# Patient Record
Sex: FEMALE | Race: OTHER | URBAN - METROPOLITAN AREA
[De-identification: names, ages, dates, MRNs, and addresses within clinical notes are randomized per-mention and may not be internally consistent; named-entity substitution may affect disease eponyms.]

---

## 2018-06-02 ENCOUNTER — OUTPATIENT (OUTPATIENT)
Dept: OUTPATIENT SERVICES | Facility: HOSPITAL | Age: 73
LOS: 1 days | Discharge: HOME | End: 2018-06-02

## 2018-06-02 DIAGNOSIS — D64.9 ANEMIA, UNSPECIFIED: ICD-10-CM

## 2018-06-02 DIAGNOSIS — R79.9 ABNORMAL FINDING OF BLOOD CHEMISTRY, UNSPECIFIED: ICD-10-CM

## 2018-06-11 ENCOUNTER — OUTPATIENT (OUTPATIENT)
Dept: OUTPATIENT SERVICES | Facility: HOSPITAL | Age: 73
LOS: 1 days | Discharge: HOME | End: 2018-06-11

## 2018-06-11 DIAGNOSIS — Z79.899 OTHER LONG TERM (CURRENT) DRUG THERAPY: ICD-10-CM

## 2025-06-12 ENCOUNTER — INPATIENT (INPATIENT)
Facility: HOSPITAL | Age: 80
LOS: 0 days | Discharge: HOME HEALTH | DRG: 101 | End: 2025-06-13
Attending: PSYCHIATRY & NEUROLOGY | Admitting: PSYCHIATRY & NEUROLOGY
Payer: MEDICARE

## 2025-06-12 VITALS
TEMPERATURE: 98 F | HEART RATE: 72 BPM | WEIGHT: 104.06 LBS | DIASTOLIC BLOOD PRESSURE: 67 MMHG | OXYGEN SATURATION: 95 % | RESPIRATION RATE: 16 BRPM | SYSTOLIC BLOOD PRESSURE: 154 MMHG

## 2025-06-12 DIAGNOSIS — R56.9 UNSPECIFIED CONVULSIONS: ICD-10-CM

## 2025-06-12 DIAGNOSIS — Z96.1 PRESENCE OF INTRAOCULAR LENS: Chronic | ICD-10-CM

## 2025-06-12 DIAGNOSIS — Z95.0 PRESENCE OF CARDIAC PACEMAKER: Chronic | ICD-10-CM

## 2025-06-12 LAB
ALBUMIN SERPL ELPH-MCNC: 4.1 G/DL — SIGNIFICANT CHANGE UP (ref 3.5–5.2)
ALP SERPL-CCNC: 87 U/L — SIGNIFICANT CHANGE UP (ref 30–115)
ALT FLD-CCNC: 39 U/L — SIGNIFICANT CHANGE UP (ref 0–41)
ANION GAP SERPL CALC-SCNC: 10 MMOL/L — SIGNIFICANT CHANGE UP (ref 7–14)
ANION GAP SERPL CALC-SCNC: 13 MMOL/L — SIGNIFICANT CHANGE UP (ref 7–14)
APPEARANCE UR: CLEAR — SIGNIFICANT CHANGE UP
APTT BLD: 25.8 SEC — LOW (ref 27–39.2)
AST SERPL-CCNC: 45 U/L — HIGH (ref 0–41)
BASOPHILS # BLD AUTO: 0.1 K/UL — SIGNIFICANT CHANGE UP (ref 0–0.2)
BASOPHILS NFR BLD AUTO: 1.5 % — HIGH (ref 0–1)
BILIRUB SERPL-MCNC: 0.4 MG/DL — SIGNIFICANT CHANGE UP (ref 0.2–1.2)
BILIRUB UR-MCNC: NEGATIVE — SIGNIFICANT CHANGE UP
BUN SERPL-MCNC: 25 MG/DL — HIGH (ref 10–20)
BUN SERPL-MCNC: 28 MG/DL — HIGH (ref 10–20)
CALCIUM SERPL-MCNC: 9.6 MG/DL — SIGNIFICANT CHANGE UP (ref 8.4–10.5)
CALCIUM SERPL-MCNC: 9.9 MG/DL — SIGNIFICANT CHANGE UP (ref 8.4–10.5)
CHLORIDE SERPL-SCNC: 106 MMOL/L — SIGNIFICANT CHANGE UP (ref 98–110)
CHLORIDE SERPL-SCNC: 106 MMOL/L — SIGNIFICANT CHANGE UP (ref 98–110)
CO2 SERPL-SCNC: 23 MMOL/L — SIGNIFICANT CHANGE UP (ref 17–32)
CO2 SERPL-SCNC: 24 MMOL/L — SIGNIFICANT CHANGE UP (ref 17–32)
COLOR SPEC: YELLOW — SIGNIFICANT CHANGE UP
CREAT SERPL-MCNC: 1.2 MG/DL — SIGNIFICANT CHANGE UP (ref 0.7–1.5)
CREAT SERPL-MCNC: 1.5 MG/DL — SIGNIFICANT CHANGE UP (ref 0.7–1.5)
DIFF PNL FLD: NEGATIVE — SIGNIFICANT CHANGE UP
EGFR: 35 ML/MIN/1.73M2 — LOW
EGFR: 35 ML/MIN/1.73M2 — LOW
EGFR: 46 ML/MIN/1.73M2 — LOW
EGFR: 46 ML/MIN/1.73M2 — LOW
EOSINOPHIL # BLD AUTO: 0.08 K/UL — SIGNIFICANT CHANGE UP (ref 0–0.7)
EOSINOPHIL NFR BLD AUTO: 1.2 % — SIGNIFICANT CHANGE UP (ref 0–8)
GLUCOSE BLDC GLUCOMTR-MCNC: 83 MG/DL — SIGNIFICANT CHANGE UP (ref 70–99)
GLUCOSE SERPL-MCNC: 247 MG/DL — HIGH (ref 70–99)
GLUCOSE SERPL-MCNC: 72 MG/DL — SIGNIFICANT CHANGE UP (ref 70–99)
GLUCOSE UR QL: 100 MG/DL
HCT VFR BLD CALC: 35.7 % — LOW (ref 37–47)
HGB BLD-MCNC: 11.1 G/DL — LOW (ref 12–16)
IMM GRANULOCYTES NFR BLD AUTO: 0.5 % — HIGH (ref 0.1–0.3)
INR BLD: 0.91 RATIO — SIGNIFICANT CHANGE UP (ref 0.65–1.3)
KETONES UR QL: NEGATIVE MG/DL — SIGNIFICANT CHANGE UP
LEUKOCYTE ESTERASE UR-ACNC: NEGATIVE — SIGNIFICANT CHANGE UP
LYMPHOCYTES # BLD AUTO: 1.56 K/UL — SIGNIFICANT CHANGE UP (ref 1.2–3.4)
LYMPHOCYTES # BLD AUTO: 23.5 % — SIGNIFICANT CHANGE UP (ref 20.5–51.1)
MCHC RBC-ENTMCNC: 27.6 PG — SIGNIFICANT CHANGE UP (ref 27–31)
MCHC RBC-ENTMCNC: 31.1 G/DL — LOW (ref 32–37)
MCV RBC AUTO: 88.8 FL — SIGNIFICANT CHANGE UP (ref 81–99)
MONOCYTES # BLD AUTO: 0.46 K/UL — SIGNIFICANT CHANGE UP (ref 0.1–0.6)
MONOCYTES NFR BLD AUTO: 6.9 % — SIGNIFICANT CHANGE UP (ref 1.7–9.3)
NEUTROPHILS # BLD AUTO: 4.42 K/UL — SIGNIFICANT CHANGE UP (ref 1.4–6.5)
NEUTROPHILS NFR BLD AUTO: 66.4 % — SIGNIFICANT CHANGE UP (ref 42.2–75.2)
NITRITE UR-MCNC: NEGATIVE — SIGNIFICANT CHANGE UP
NRBC BLD AUTO-RTO: 0 /100 WBCS — SIGNIFICANT CHANGE UP (ref 0–0)
PH UR: 5.5 — SIGNIFICANT CHANGE UP (ref 5–8)
PLATELET # BLD AUTO: 234 K/UL — SIGNIFICANT CHANGE UP (ref 130–400)
PMV BLD: 11.2 FL — HIGH (ref 7.4–10.4)
POTASSIUM SERPL-MCNC: 4.3 MMOL/L — SIGNIFICANT CHANGE UP (ref 3.5–5)
POTASSIUM SERPL-MCNC: 5.4 MMOL/L — HIGH (ref 3.5–5)
POTASSIUM SERPL-SCNC: 4.3 MMOL/L — SIGNIFICANT CHANGE UP (ref 3.5–5)
POTASSIUM SERPL-SCNC: 5.4 MMOL/L — HIGH (ref 3.5–5)
PROT SERPL-MCNC: 7.2 G/DL — SIGNIFICANT CHANGE UP (ref 6–8)
PROT UR-MCNC: NEGATIVE MG/DL — SIGNIFICANT CHANGE UP
PROTHROM AB SERPL-ACNC: 10.7 SEC — SIGNIFICANT CHANGE UP (ref 9.95–12.87)
RBC # BLD: 4.02 M/UL — LOW (ref 4.2–5.4)
RBC # FLD: 13.7 % — SIGNIFICANT CHANGE UP (ref 11.5–14.5)
SODIUM SERPL-SCNC: 139 MMOL/L — SIGNIFICANT CHANGE UP (ref 135–146)
SODIUM SERPL-SCNC: 143 MMOL/L — SIGNIFICANT CHANGE UP (ref 135–146)
SP GR SPEC: >1.03 — HIGH (ref 1–1.03)
TROPONIN T, HIGH SENSITIVITY RESULT: 20 NG/L — HIGH (ref 6–13)
TROPONIN T, HIGH SENSITIVITY RESULT: 22 NG/L — HIGH (ref 6–13)
UROBILINOGEN FLD QL: 0.2 MG/DL — SIGNIFICANT CHANGE UP (ref 0.2–1)
WBC # BLD: 6.65 K/UL — SIGNIFICANT CHANGE UP (ref 4.8–10.8)
WBC # FLD AUTO: 6.65 K/UL — SIGNIFICANT CHANGE UP (ref 4.8–10.8)

## 2025-06-12 PROCEDURE — 84100 ASSAY OF PHOSPHORUS: CPT

## 2025-06-12 PROCEDURE — 92610 EVALUATE SWALLOWING FUNCTION: CPT | Mod: GN

## 2025-06-12 PROCEDURE — 84484 ASSAY OF TROPONIN QUANT: CPT

## 2025-06-12 PROCEDURE — 82962 GLUCOSE BLOOD TEST: CPT

## 2025-06-12 PROCEDURE — 95700 EEG CONT REC W/VID EEG TECH: CPT

## 2025-06-12 PROCEDURE — 83735 ASSAY OF MAGNESIUM: CPT

## 2025-06-12 PROCEDURE — 70496 CT ANGIOGRAPHY HEAD: CPT | Mod: 26

## 2025-06-12 PROCEDURE — 36415 COLL VENOUS BLD VENIPUNCTURE: CPT

## 2025-06-12 PROCEDURE — 95714 VEEG EA 12-26 HR UNMNTR: CPT

## 2025-06-12 PROCEDURE — 85610 PROTHROMBIN TIME: CPT

## 2025-06-12 PROCEDURE — 85730 THROMBOPLASTIN TIME PARTIAL: CPT

## 2025-06-12 PROCEDURE — 0042T: CPT

## 2025-06-12 PROCEDURE — 80048 BASIC METABOLIC PNL TOTAL CA: CPT

## 2025-06-12 PROCEDURE — 80053 COMPREHEN METABOLIC PANEL: CPT

## 2025-06-12 PROCEDURE — 85025 COMPLETE CBC W/AUTO DIFF WBC: CPT

## 2025-06-12 PROCEDURE — 76377 3D RENDER W/INTRP POSTPROCES: CPT

## 2025-06-12 PROCEDURE — 70450 CT HEAD/BRAIN W/O DYE: CPT | Mod: 26,XU

## 2025-06-12 PROCEDURE — 71045 X-RAY EXAM CHEST 1 VIEW: CPT | Mod: 26

## 2025-06-12 PROCEDURE — 93005 ELECTROCARDIOGRAM TRACING: CPT

## 2025-06-12 PROCEDURE — 70553 MRI BRAIN STEM W/O & W/DYE: CPT

## 2025-06-12 PROCEDURE — 71046 X-RAY EXAM CHEST 2 VIEWS: CPT

## 2025-06-12 PROCEDURE — 99285 EMERGENCY DEPT VISIT HI MDM: CPT

## 2025-06-12 PROCEDURE — 70498 CT ANGIOGRAPHY NECK: CPT | Mod: 26

## 2025-06-12 PROCEDURE — 83036 HEMOGLOBIN GLYCOSYLATED A1C: CPT

## 2025-06-12 RX ORDER — INSULIN ASPART 100 [IU]/ML
0 INJECTION, SOLUTION INTRAVENOUS; SUBCUTANEOUS
Refills: 0 | DISCHARGE

## 2025-06-12 RX ORDER — DEXTROSE 50 % IN WATER 50 %
12.5 SYRINGE (ML) INTRAVENOUS ONCE
Refills: 0 | Status: DISCONTINUED | OUTPATIENT
Start: 2025-06-12 | End: 2025-06-13

## 2025-06-12 RX ORDER — LISINOPRIL 5 MG/1
0 TABLET ORAL
Qty: 0 | Refills: 1 | DISCHARGE

## 2025-06-12 RX ORDER — LEVETIRACETAM 10 MG/ML
1000 INJECTION, SOLUTION INTRAVENOUS ONCE
Refills: 0 | Status: COMPLETED | OUTPATIENT
Start: 2025-06-12 | End: 2025-06-12

## 2025-06-12 RX ORDER — LEVETIRACETAM 10 MG/ML
500 INJECTION, SOLUTION INTRAVENOUS EVERY 12 HOURS
Refills: 0 | Status: DISCONTINUED | OUTPATIENT
Start: 2025-06-12 | End: 2025-06-13

## 2025-06-12 RX ORDER — GLUCAGON 3 MG/1
1 POWDER NASAL ONCE
Refills: 0 | Status: DISCONTINUED | OUTPATIENT
Start: 2025-06-12 | End: 2025-06-13

## 2025-06-12 RX ORDER — LEVOTHYROXINE SODIUM 300 MCG
88 TABLET ORAL DAILY
Refills: 0 | Status: DISCONTINUED | OUTPATIENT
Start: 2025-06-12 | End: 2025-06-13

## 2025-06-12 RX ORDER — HEPARIN SODIUM 1000 [USP'U]/ML
5000 INJECTION INTRAVENOUS; SUBCUTANEOUS EVERY 12 HOURS
Refills: 0 | Status: DISCONTINUED | OUTPATIENT
Start: 2025-06-12 | End: 2025-06-13

## 2025-06-12 RX ORDER — DEXTROSE 50 % IN WATER 50 %
25 SYRINGE (ML) INTRAVENOUS ONCE
Refills: 0 | Status: DISCONTINUED | OUTPATIENT
Start: 2025-06-12 | End: 2025-06-13

## 2025-06-12 RX ORDER — LORATADINE 5 MG/5ML
0 SOLUTION ORAL
Qty: 0 | Refills: 3 | DISCHARGE

## 2025-06-12 RX ORDER — SODIUM CHLORIDE 9 G/1000ML
1000 INJECTION, SOLUTION INTRAVENOUS
Refills: 0 | Status: DISCONTINUED | OUTPATIENT
Start: 2025-06-12 | End: 2025-06-13

## 2025-06-12 RX ORDER — LISINOPRIL 5 MG/1
5 TABLET ORAL DAILY
Refills: 0 | Status: DISCONTINUED | OUTPATIENT
Start: 2025-06-12 | End: 2025-06-12

## 2025-06-12 RX ORDER — DEXTROSE 50 % IN WATER 50 %
15 SYRINGE (ML) INTRAVENOUS ONCE
Refills: 0 | Status: DISCONTINUED | OUTPATIENT
Start: 2025-06-12 | End: 2025-06-13

## 2025-06-12 RX ORDER — INSULIN DEGLUDEC 100 U/ML
0 INJECTION, SOLUTION SUBCUTANEOUS
Qty: 0 | Refills: 1 | DISCHARGE

## 2025-06-12 RX ORDER — INSULIN LISPRO 100 U/ML
INJECTION, SOLUTION INTRAVENOUS; SUBCUTANEOUS
Refills: 0 | Status: DISCONTINUED | OUTPATIENT
Start: 2025-06-12 | End: 2025-06-13

## 2025-06-12 RX ORDER — INSULIN LISPRO 100 U/ML
INJECTION, SOLUTION INTRAVENOUS; SUBCUTANEOUS AT BEDTIME
Refills: 0 | Status: DISCONTINUED | OUTPATIENT
Start: 2025-06-12 | End: 2025-06-13

## 2025-06-12 RX ORDER — FENOFIBRATE 160 MG/1
48 TABLET ORAL DAILY
Refills: 0 | Status: DISCONTINUED | OUTPATIENT
Start: 2025-06-12 | End: 2025-06-12

## 2025-06-12 RX ORDER — ATORVASTATIN CALCIUM 80 MG/1
10 TABLET, FILM COATED ORAL AT BEDTIME
Refills: 0 | Status: DISCONTINUED | OUTPATIENT
Start: 2025-06-12 | End: 2025-06-13

## 2025-06-12 RX ORDER — LORAZEPAM 4 MG/ML
2 VIAL (ML) INJECTION ONCE
Refills: 0 | Status: DISCONTINUED | OUTPATIENT
Start: 2025-06-12 | End: 2025-06-13

## 2025-06-12 RX ORDER — INSULIN GLARGINE-YFGN 100 [IU]/ML
30 INJECTION, SOLUTION SUBCUTANEOUS AT BEDTIME
Refills: 0 | Status: DISCONTINUED | OUTPATIENT
Start: 2025-06-12 | End: 2025-06-13

## 2025-06-12 RX ORDER — CALCITRIOL 0.5 UG/1
0.25 CAPSULE, GELATIN COATED ORAL
Refills: 0 | Status: DISCONTINUED | OUTPATIENT
Start: 2025-06-12 | End: 2025-06-13

## 2025-06-12 RX ORDER — FENOFIBRATE 160 MG/1
48 TABLET ORAL DAILY
Refills: 0 | Status: DISCONTINUED | OUTPATIENT
Start: 2025-06-12 | End: 2025-06-13

## 2025-06-12 RX ORDER — LORAZEPAM 4 MG/ML
2 VIAL (ML) INJECTION ONCE
Refills: 0 | Status: DISCONTINUED | OUTPATIENT
Start: 2025-06-12 | End: 2025-06-12

## 2025-06-12 RX ORDER — LEVOTHYROXINE SODIUM 300 MCG
88 TABLET ORAL DAILY
Refills: 0 | Status: DISCONTINUED | OUTPATIENT
Start: 2025-06-12 | End: 2025-06-12

## 2025-06-12 RX ADMIN — Medication 88 MICROGRAM(S): at 18:26

## 2025-06-12 RX ADMIN — ATORVASTATIN CALCIUM 10 MILLIGRAM(S): 80 TABLET, FILM COATED ORAL at 21:58

## 2025-06-12 RX ADMIN — HEPARIN SODIUM 5000 UNIT(S): 1000 INJECTION INTRAVENOUS; SUBCUTANEOUS at 18:25

## 2025-06-12 RX ADMIN — LEVETIRACETAM 400 MILLIGRAM(S): 10 INJECTION, SOLUTION INTRAVENOUS at 13:57

## 2025-06-12 RX ADMIN — FENOFIBRATE 48 MILLIGRAM(S): 160 TABLET ORAL at 18:26

## 2025-06-12 RX ADMIN — LEVETIRACETAM 500 MILLIGRAM(S): 10 INJECTION, SOLUTION INTRAVENOUS at 18:25

## 2025-06-12 RX ADMIN — Medication 2 MILLIGRAM(S): at 13:57

## 2025-06-12 NOTE — ED PROVIDER NOTE - CLINICAL SUMMARY MEDICAL DECISION MAKING FREE TEXT BOX
79-year-old female past medical history as documented with an episode of decreased responsiveness during a CCTA just prior to arrival today.  Stroke code activated on arrival.  On arrival, patient with decreased responsiveness and dysarthria.  Neurology recommendations appreciated.  All labs reviewed.  CT scans with no acute pathology.  EKG ventricularly paced.  Chest x-ray with no acute pathology and pacemaker.  Patient with separate episodes of decreased responsiveness and seizure-like activity.  Ativan given.  Neurology reconsulted.  Patient admitted to neurology for further evaluation and treatment.

## 2025-06-12 NOTE — ED ADULT NURSE REASSESSMENT NOTE - NS ED NURSE REASSESS COMMENT FT1
Pt had episode of seizure like activity- MD Vieira at bedside- ativan given-Pt symptoms resolved immediately. VSS(see flowsheet) A&Ox4. Keppra IV hung(see MAR)

## 2025-06-12 NOTE — ED PROVIDER NOTE - OBJECTIVE STATEMENT
79-year-old female with a past medical history of hypertension, CABG, hyperlipidemia, diabetes, seizures, on Keppra presents to the emergency department after an episode of aphasia and AMS after receiving nitroglycerin during CCTA.  Patient presents to the emergency department with expressive aphasia that subsequently improved to dysarthria.  Patient still somewhat dysarthric but is able to speak in full sentences.  Patient had a similar episode 3 years ago when she was initiated on Keppra.  Unclear if at the time it was a seizure as patient is unsure.  Denies recent fever/chills/chest pain/shortness of breath/numbness/weakness.

## 2025-06-12 NOTE — H&P ADULT - NSHPLABSRESULTS_GEN_ALL_CORE
<<<<<LABS>>>>>                        11.1   6.65  )-----------( 234      ( 12 Jun 2025 12:15 )             35.7     06-12    139  |  106  |  28[H]  ----------------------------<  247[H]  5.4[H]   |  23  |  1.5    Ca    9.9      12 Jun 2025 12:15    TPro  7.2  /  Alb  4.1  /  TBili  0.4  /  DBili  x   /  AST  45[H]  /  ALT  39  /  AlkPhos  87  06-12    PT/INR - ( 12 Jun 2025 12:15 )   PT: 10.70 sec;   INR: 0.91 ratio         PTT - ( 12 Jun 2025 12:15 )  PTT:25.8 sec      ----------------------------------------------------------------------------------------------------------------------------------------------------------------------------------------------- <<<<<LABS>>>>>                        11.1   6.65  )-----------( 234      ( 12 Jun 2025 12:15 )             35.7     06-12    139  |  106  |  28[H]  ----------------------------<  247[H]  5.4[H]   |  23  |  1.5    Ca    9.9      12 Jun 2025 12:15    TPro  7.2  /  Alb  4.1  /  TBili  0.4  /  DBili  x   /  AST  45[H]  /  ALT  39  /  AlkPhos  87  06-12    PT/INR - ( 12 Jun 2025 12:15 )   PT: 10.70 sec;   INR: 0.91 ratio         PTT - ( 12 Jun 2025 12:15 )  PTT:25.8 sec    -----------------------------------------------------------------------------------------------------------------------------------------------------------------------------------------------

## 2025-06-12 NOTE — H&P ADULT - ASSESSMENT
79-year-old female with a past medical history of hypertension, CABG, hyperlipidemia, diabetes, seizures, on Keppra presents to the emergency department after an episode of minimal responsiveness and AMS after receiving nitroglycerin during CCTA.  Patient presents to the emergency department with expressive aphasia that subsequently improved to dysarthria.  Patient still somewhat dysarthric but is able to speak in full sentences.  Patient had a similar episode 3 years ago when she was initiated on Keppra.  Unclear if at the time it was a seizure as patient is unsure. Stroke code on arrival NIHSS 0  CTH without acute intracranial pathology. CTA without LVO not a candidate for IV or IA acute stroke intervention. Etiology of symptoms more likely due to toxic metabolic cause which may be due to medication administration, less likely seizure.  79-year-old female with a past medical history of hypertension, CABG, hyperlipidemia, diabetes, seizures, on Keppra presents to the emergency department after an episode of minimal responsiveness and AMS after receiving nitroglycerin during CCTA.  Patient presents to the emergency department with expressive aphasia that subsequently improved to dysarthria.  Patient still somewhat dysarthric but is able to speak in full sentences.  Patient had a similar episode 3 years ago when she was initiated on Keppra.  Unclear if at the time it was a seizure as patient is unsure. Stroke code on arrival NIHSS 0  CTH without acute intracranial pathology. CTA without LVO not a candidate for IV or IA acute stroke intervention. Etiology of symptoms more likely due to toxic metabolic cause which may be due to medication administration, less likely seizure.     #Seizure-like activity  - s/p keppra 1000 mg x 1 and ativan 2 mg x 1 in the ED  - UA neg for infection  - Neurochecks and vitals q8h  - Seizure precautions, fall precautions  - Pending brain MRI noncon for seizure work up  - Pending CXR  - Start vEEG  - Ativan 2 mg IV PRN for seizure  - On home keppra 250 mg bid, start keppra 500 mg IV bid    #HTN  - HOLDING home lisinopril 5 mg daily    #DM  - On home glipizide 5 mg bid, lispro 6 u in morning, 15 u at lunch, and 20 u at dinner, lantus 30 mg at bedtime  - c/w FS and mISS, holding home insulin while NPO    #HLD  - On home simvastatin 20 mg nightly and fenofibrate 48 mg daily  - c/w atorvastatin 10 mg nightly and fenofibrate 48 mg daily    #Hypothyroidism  - c/w home levothyroxine 88 mcg daily    #MISC  - DVT PPx: Lovenox  - Diet: NPO until dysphagia screen and S&S  - CODE Status: Full  - Dispo: General Neurology 79-year-old female with a past medical history of hypertension, CABG, hyperlipidemia, diabetes, seizures, on Keppra presented to the emergency department after an episode of minimal responsiveness and AMS after receiving nitroglycerin during CCTA.  Stroke code called on arrival CTH within normal limits and CTA without LVO or significant stenoses. Had 2 witnessed generalized tonic clonic seizures in the ED. Besides the dose skipped today the patient reported good adherence to keppra. Suspect sudden onset confusion and aphasia secondary to hypoperfusion after receiving nitroglycerin vs TIA. Seizures may have been triggered by hypoperfusion in setting of missing her dose the day of admission.     #Seizure-like activity  - s/p keppra 1000 mg x 1 and ativan 2 mg x 1 in the ED  - UA neg for infection  - CXR negative for pneumonia   - Neurochecks and vitals q8h  - Seizure precautions, fall precautions  - Pending brain MRI noncon for seizure work up  - Start vEEG  - Ativan 2 mg IV PRN for seizure  - On home keppra 250 mg bid, start keppra 500 mg IV bid    #HTN  - HOLDING home lisinopril 5 mg daily    #DM  - On home glipizide 5 mg bid, lispro 6 u in morning, 15 u at lunch, and 20 u at dinner, lantus 30 mg at bedtime  - c/w FS and mISS, holding home insulin while NPO    #HLD  - On home simvastatin 20 mg nightly and fenofibrate 48 mg daily  - c/w atorvastatin 10 mg nightly and fenofibrate 48 mg daily    #Hypothyroidism  - c/w home levothyroxine 88 mcg daily    #MISC  - DVT PPx: Lovenox  - Diet: NPO until dysphagia screen and S&S  - CODE Status: Full  - Dispo: General Neurology

## 2025-06-12 NOTE — ED ADULT NURSE NOTE - NSFALLRISKINTERV_ED_ALL_ED
Assistance OOB with selected safe patient handling equipment if applicable/Assistance with ambulation/Communicate fall risk and risk factors to all staff, patient, and family/Monitor gait and stability/Monitor for mental status changes and reorient to person, place, and time, as needed/Provide visual cue: yellow wristband, yellow gown, etc/Reinforce activity limits and safety measures with patient and family/Toileting schedule using arm’s reach rule for commode and bathroom/Use of alarms - bed, stretcher, chair and/or video monitoring/Call bell, personal items and telephone in reach/Instruct patient to call for assistance before getting out of bed/chair/stretcher/Non-slip footwear applied when patient is off stretcher/[place] to call system/Physically safe environment - no spills, clutter or unnecessary equipment/Purposeful Proactive Rounding/Room/bathroom lighting operational, light cord in reach

## 2025-06-12 NOTE — CONSULT NOTE ADULT - TIME-BASED BILLING (NON-CRITICAL CARE)
Time-based billing (NON-critical care) Patient requests all Lab, Cardiology, and Radiology Results on their Discharge Instructions

## 2025-06-12 NOTE — ED PROVIDER NOTE - ATTENDING CONTRIBUTION TO CARE
79-year-old female past medical history significant for hypertension, dyslipidemia, diabetes, seizure disorder, hypothyroidism, CAD, status post CABG, CKD, was in the process of having a CCTA performed, was given nitro and metoprolol and then became acutely altered.  The CAT scan was stopped and EMS was called. 79-year-old female past medical history significant for hypertension, dyslipidemia, diabetes, seizure disorder, hypothyroidism, CAD, status post CABG, CKD, was in the process of having a CCTA performed, was given nitro and metoprolol and then became acutely altered.  The CAT scan was stopped and EMS was called. Patient became altered approximately half an hour prior to arrival in the ED.  Patient with difficulty speaking and intermittently able to give a history.  Patient denies any headache, chest pain, abdominal pain.  Patient was well today prior to the onset of symptoms.  Stroke code activated on arrival.  CONSTITUTIONAL: Alert. Intermittently able to answer questions and follow commands.NAD.   HEAD: Normocephalic; atraumatic.   EYES: PERRL; EOM intact. Conjunctiva normal B/L.   ENT: Normal pharynx with no tonsillar hypertrophy. MMM.  NECK: Supple; non-tender; no cervical lymphadenopathy.   CHEST: Normal chest excursion with respiration.   CARDIOVASCULAR: Normal S1, S2; no murmurs, rubs, or gallops.   RESPIRATORY: Normal chest excursion with respiration; breath sounds clear and equal bilaterally; no wheezes, rhonchi, or rales.  GI/: Normal bowel sounds; non-distended; non-tender.  BACK: No evidence of trauma or deformity. Non-tender to palpation. No CVA tenderness.   EXT: Normal ROM in all four extremities; non-tender to palpation; distal pulses are normal. No leg edema B/L.   SKIN: Normal for age and race; warm; dry; good turgor.  NEURO:  Grossly unremarkable.Patient moving all extremities.  No facial droop.  Positive expressive aphasia.

## 2025-06-12 NOTE — ED PROVIDER NOTE - PHYSICAL EXAMINATION
VITAL SIGNS: I have reviewed nursing notes and confirm.  CONSTITUTIONAL: Well-developed; well-nourished; in no acute distress.  SKIN: Skin exam is warm and dry, no acute rash.  CARD: S1, S2 normal; no murmurs, gallops, or rubs. Regular rate and rhythm.  RESP: Normal respiratory effort, no tachypnea or distress. Lungs CTAB, no wheezes, rales or rhonchi.  ABD: soft, NT/ND.  EXT: Normal ROM. No clubbing, cyanosis or edema.  LYMPH: No acute cervical adenopathy.  Neuro: A&Ox3, Dysarthria, CN II-XII intact,  Sensation intact and equal.   PSYCH: Cooperative, appropriate.

## 2025-06-12 NOTE — H&P ADULT - NSHPPHYSICALEXAM_GEN_ALL_CORE
GENERAL: well-developed, well-nourished, in no acute distress    NEUROLOGIC:  Mental status: AOx4  Language: Speech with intact fluency , naming , repetition , comprehension, absent dysarthria, patient is hypophonic (baseline)  Cranial nerves:   II: Visual fields are full to confrontation  III, IV, VI: Extraocular movements intact, no noticeable nystagmus, pupils equally round and reactive to light  V:  Facial sensation V1-V3 equal and intact   VII: L facial droop (per patient this is baseline)  VIII: Hearing is intact bilaterally intact  IX, X: Uvula is midline and soft palate rises symmetrically  XI: Head turning and shoulder shrug are intact and symmetric  XII: Tongue protrudes midline  Motor:  - shoulder abduction     R 5/5     L 5/5  - elbow flexion     R 5/5     L 5/5  - elbow extension     R 5/5     L 5/5  - hand      R 5/5     L 5/5   - hip flexion     R 5/5     L 5/5  - knee extension     R 5/5     L 5/5  - dorsiflexion     R 5/5     L 5/5  - plantarflexion     R 5/5     L 5/5  Sensation: Intact to light touch bilaterally. No neglect or extinction on double simultaneous testing.  Coordination: No dysmetria on finger-to-nose and heel-to-shin bilaterally, rapid alternating movements intact and symmetric  Reflexes:   - biceps     R 2+     L 2+  - triceps     R 2+     L 2+  - brachioradialis     R 2+     L 2+  - patellar     R 1+     L 1+  - Achilles     R 2+     L 2+  - Babinski     R downgoing    L downgoing  Gait: Deferred at this encounter

## 2025-06-12 NOTE — H&P ADULT - ATTENDING COMMENTS
Patient seen and examined and agree with above except as noted.  Patients history, notes, labs, imaging, vitals and meds reviewed personally.  Slow to respond and c/o lightheadedness  Has not eaten in 48 hours as per patient    Plan as above

## 2025-06-12 NOTE — ED ADULT TRIAGE NOTE - CHIEF COMPLAINT QUOTE
Pre note BIBEMS - pt was getting a CTA when they pushed nitro pt became altered. Pt A&Ox3 baseline, pt now responsive to verbal, not following commands and unablke to speak. stroke code called. As per EMS [id number]

## 2025-06-12 NOTE — ED ADULT TRIAGE NOTE - SPO2 (%)
losartan      Last Written Prescription Date: 6/5/2017  Last Fill Quantity: 90, # refills: 0  Last Office Visit with [facility], [facility] or [facility] prescribing provider: 8/18/2017       Potassium   Date Value Ref Range Status   04/10/2017 3.7 3.4 - 5.3 mmol/L Final     Creatinine   Date Value Ref Range Status   04/10/2017 0.48 (L) 0.52 - 1.04 mg/dL Final     BP Readings from Last 3 Encounters:   08/18/17 134/86   06/15/17 135/62   04/10/17 134/84     metoprolol      Last Written Prescription Date: 6/5/2017  Last Fill Quantity: 90, # refills: 0  Last Office Visit with [facility], [facility] or [facility] prescribing provider: 8/18/2017       Potassium   Date Value Ref Range Status   04/10/2017 3.7 3.4 - 5.3 mmol/L Final     Creatinine   Date Value Ref Range Status   04/10/2017 0.48 (L) 0.52 - 1.04 mg/dL Final     BP Readings from Last 3 Encounters:   08/18/17 134/86   06/15/17 135/62   04/10/17 134/84   Prescription approved per [facility] Refill Protocol.    Duloxetine historical.  Routing refill request to provider for review/approval because:  Medication is reported/historical  Cheryl Garner RN  [facility]           95

## 2025-06-12 NOTE — CONSULT NOTE ADULT - NS ATTEND AMEND GEN_ALL_CORE FT
On my eval, patient nearly back to baseline; awake, oriented, alert, following all commands, no focal deficits. CTh without acute findings, CTA H/N without notable stenoses. CTP without perfusion deficits. Low suspicion for neurovascular event, more likely transient TME. Recs as above

## 2025-06-12 NOTE — H&P ADULT - HISTORY OF PRESENT ILLNESS
79-year-old female with a past medical history of hypertension, CABG, hyperlipidemia, diabetes, seizures, on Keppra presents to the emergency department after an episode of minimal responsiveness and AMS after receiving nitroglycerin during CCTA.  Patient presents to the emergency department with expressive aphasia that subsequently improved to dysarthria.  Patient still somewhat dysarthric but is able to speak in full sentences.  Patient had a similar episode 3 years ago when she was initiated on Keppra.  Unclear if at the time it was a seizure as patient is unsure. Stroke code on arrival NIHSS 0  CTH without acute intracranial pathology. CTA without LVO not a candidate for IV or IA acute stroke intervention. Had 2 witnessed generalized tonic-clonic seizures requiring ativan. Missed dose of keppra 250mg twice daily       VITAL SIGNS: Last 24 Hours  T(C): 37 (12 Jun 2025 13:22), Max: 37 (12 Jun 2025 13:22)  T(F): 98.6 (12 Jun 2025 13:22), Max: 98.6 (12 Jun 2025 13:22)  HR: 66 (12 Jun 2025 14:00) (60 - 72)  BP: 136/63 (12 Jun 2025 14:00) (135/68 - 154/67)  BP(mean): 91 (12 Jun 2025 14:00) (91 - 91)  RR: 18 (12 Jun 2025 14:00) (16 - 18)  SpO2: 97% (12 Jun 2025 14:00) (95% - 99%)    Admitted to neurology med/surg 79-year-old female with a past medical history of hypertension, CABG, hyperlipidemia, diabetes, seizures, on Keppra presents to the emergency department after an episode of minimal responsiveness and AMS after receiving nitroglycerin during CCTA.  Further collateral obtained by granddaughter at bedside. Patient presented to the emergency department with expressive aphasia that subsequently improved to dysarthria. Stroke code on arrival NIHSS 0  CTH without acute intracranial pathology. CTA without LVO not a candidate for IV or IA acute stroke intervention. Had 2 witnessed generalized tonic-clonic seizures requiring ativan. Granddaughter witnessed one of these that was characterized by tonic-clonic movement of all extremities with eyes open and looking upward lasting 1 second, no urinary incontinence, no tongue bite, followed by 40min of lethargy and staring. Missed dose of keppra 250mg twice daily today. Patient reported last seizure happened 3 years ago suddenly while sitting on the couch, unclear trigger. 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 Patient reported being adherent to keppra for ~5 years though the dose has been adjusted several times. Epilepsy now managed by primary care provider because her neurologist retired. Patient unsure why they were sent for CCTA by cardiology. Reported has a history of epilepsy since childhood, denied head injury, known intracranial masses or strokes, insomnia, smoking, recreational drug use, alcohol use. Reported cough for 3 weeks for which primary care provider prescribed a course of antibiotics that she completed.    VITAL SIGNS: Last 24 Hours  T(C): 37 (12 Jun 2025 13:22), Max: 37 (12 Jun 2025 13:22)  T(F): 98.6 (12 Jun 2025 13:22), Max: 98.6 (12 Jun 2025 13:22)  HR: 66 (12 Jun 2025 14:00) (60 - 72)  BP: 136/63 (12 Jun 2025 14:00) (135/68 - 154/67)  BP(mean): 91 (12 Jun 2025 14:00) (91 - 91)  RR: 18 (12 Jun 2025 14:00) (16 - 18)  SpO2: 97% (12 Jun 2025 14:00) (95% - 99%)    Admitted to neurology med/surg

## 2025-06-12 NOTE — ED PROVIDER NOTE - EKG/XRAY ADDITIONAL INFORMATION
Independent interpretation of the xray(s) performed by Dr. Kelsey Vieira:  No infiltrate, effusion or acute abnormality. +PPM.

## 2025-06-12 NOTE — CHART NOTE - NSCHARTNOTEFT_GEN_A_CORE
Called by ED, patient with GTC seizure x2 requiring Ativan.   ED reports missed dose of Keppra today.   Dr. Marion aware, increase Keppra to 500 mg q12 for provoked seizure.   If returns to baseline can follow up as outpatient.   Call with questions [phone] Called by ED, patient with GTC seizure x2 requiring Ativan.   ED reports missed dose of Keppra today.   Dr. Marion aware, increase Keppra to 500 mg q12 for provoked seizure.   Send Keppra level  If returns to baseline can follow up as outpatient.   Call with questions [phone]

## 2025-06-12 NOTE — ED PROVIDER NOTE - PROGRESS NOTE DETAILS
Patient with episodes of seizure-like activity.  Family at bedside says this is typical of patient's seizures.  Last seizure was several years ago.  Patient missed her Keppra dose this morning.

## 2025-06-13 ENCOUNTER — TRANSCRIPTION ENCOUNTER (OUTPATIENT)
Age: 80
End: 2025-06-13

## 2025-06-13 VITALS
TEMPERATURE: 98 F | RESPIRATION RATE: 18 BRPM | DIASTOLIC BLOOD PRESSURE: 67 MMHG | HEART RATE: 82 BPM | OXYGEN SATURATION: 96 % | SYSTOLIC BLOOD PRESSURE: 151 MMHG

## 2025-06-13 LAB
A1C WITH ESTIMATED AVERAGE GLUCOSE RESULT: 7.3 % — HIGH (ref 4–5.6)
ALBUMIN SERPL ELPH-MCNC: 4 G/DL — SIGNIFICANT CHANGE UP (ref 3.5–5.2)
ALBUMIN SERPL ELPH-MCNC: 4.4 G/DL — SIGNIFICANT CHANGE UP (ref 3.5–5.2)
ALP SERPL-CCNC: 78 U/L — SIGNIFICANT CHANGE UP (ref 30–115)
ALP SERPL-CCNC: 89 U/L — SIGNIFICANT CHANGE UP (ref 30–115)
ALT FLD-CCNC: 40 U/L — SIGNIFICANT CHANGE UP (ref 0–41)
ALT FLD-CCNC: 44 U/L — HIGH (ref 0–41)
ANION GAP SERPL CALC-SCNC: 14 MMOL/L — SIGNIFICANT CHANGE UP (ref 7–14)
ANION GAP SERPL CALC-SCNC: 9 MMOL/L — SIGNIFICANT CHANGE UP (ref 7–14)
APTT BLD: 24.6 SEC — LOW (ref 27–39.2)
AST SERPL-CCNC: 51 U/L — HIGH (ref 0–41)
AST SERPL-CCNC: 62 U/L — HIGH (ref 0–41)
BASOPHILS # BLD AUTO: 0.06 K/UL — SIGNIFICANT CHANGE UP (ref 0–0.2)
BASOPHILS # BLD AUTO: 0.08 K/UL — SIGNIFICANT CHANGE UP (ref 0–0.2)
BASOPHILS NFR BLD AUTO: 0.9 % — SIGNIFICANT CHANGE UP (ref 0–1)
BASOPHILS NFR BLD AUTO: 1 % — SIGNIFICANT CHANGE UP (ref 0–1)
BILIRUB SERPL-MCNC: 0.3 MG/DL — SIGNIFICANT CHANGE UP (ref 0.2–1.2)
BILIRUB SERPL-MCNC: 0.4 MG/DL — SIGNIFICANT CHANGE UP (ref 0.2–1.2)
BUN SERPL-MCNC: 26 MG/DL — HIGH (ref 10–20)
BUN SERPL-MCNC: 30 MG/DL — HIGH (ref 10–20)
CALCIUM SERPL-MCNC: 9.7 MG/DL — SIGNIFICANT CHANGE UP (ref 8.4–10.5)
CALCIUM SERPL-MCNC: 9.8 MG/DL — SIGNIFICANT CHANGE UP (ref 8.4–10.5)
CHLORIDE SERPL-SCNC: 102 MMOL/L — SIGNIFICANT CHANGE UP (ref 98–110)
CHLORIDE SERPL-SCNC: 105 MMOL/L — SIGNIFICANT CHANGE UP (ref 98–110)
CO2 SERPL-SCNC: 25 MMOL/L — SIGNIFICANT CHANGE UP (ref 17–32)
CO2 SERPL-SCNC: 25 MMOL/L — SIGNIFICANT CHANGE UP (ref 17–32)
CREAT SERPL-MCNC: 1.3 MG/DL — SIGNIFICANT CHANGE UP (ref 0.7–1.5)
CREAT SERPL-MCNC: 1.3 MG/DL — SIGNIFICANT CHANGE UP (ref 0.7–1.5)
EGFR: 42 ML/MIN/1.73M2 — LOW
EOSINOPHIL # BLD AUTO: 0.08 K/UL — SIGNIFICANT CHANGE UP (ref 0–0.7)
EOSINOPHIL # BLD AUTO: 0.13 K/UL — SIGNIFICANT CHANGE UP (ref 0–0.7)
EOSINOPHIL NFR BLD AUTO: 1.1 % — SIGNIFICANT CHANGE UP (ref 0–8)
EOSINOPHIL NFR BLD AUTO: 1.6 % — SIGNIFICANT CHANGE UP (ref 0–8)
ESTIMATED AVERAGE GLUCOSE: 163 MG/DL — HIGH (ref 68–114)
GLUCOSE BLDC GLUCOMTR-MCNC: 147 MG/DL — HIGH (ref 70–99)
GLUCOSE BLDC GLUCOMTR-MCNC: 201 MG/DL — HIGH (ref 70–99)
GLUCOSE BLDC GLUCOMTR-MCNC: 203 MG/DL — HIGH (ref 70–99)
GLUCOSE BLDC GLUCOMTR-MCNC: 211 MG/DL — HIGH (ref 70–99)
GLUCOSE SERPL-MCNC: 106 MG/DL — HIGH (ref 70–99)
GLUCOSE SERPL-MCNC: 217 MG/DL — HIGH (ref 70–99)
HCT VFR BLD CALC: 35.9 % — LOW (ref 37–47)
HCT VFR BLD CALC: 36.1 % — LOW (ref 37–47)
HGB BLD-MCNC: 11.1 G/DL — LOW (ref 12–16)
HGB BLD-MCNC: 11.6 G/DL — LOW (ref 12–16)
IMM GRANULOCYTES NFR BLD AUTO: 0.3 % — SIGNIFICANT CHANGE UP (ref 0.1–0.3)
IMM GRANULOCYTES NFR BLD AUTO: 0.4 % — HIGH (ref 0.1–0.3)
INR BLD: 0.84 RATIO — SIGNIFICANT CHANGE UP (ref 0.65–1.3)
LYMPHOCYTES # BLD AUTO: 1.55 K/UL — SIGNIFICANT CHANGE UP (ref 1.2–3.4)
LYMPHOCYTES # BLD AUTO: 1.98 K/UL — SIGNIFICANT CHANGE UP (ref 1.2–3.4)
LYMPHOCYTES # BLD AUTO: 22.1 % — SIGNIFICANT CHANGE UP (ref 20.5–51.1)
LYMPHOCYTES # BLD AUTO: 24.2 % — SIGNIFICANT CHANGE UP (ref 20.5–51.1)
MAGNESIUM SERPL-MCNC: 1.5 MG/DL — LOW (ref 1.8–2.4)
MAGNESIUM SERPL-MCNC: 3.2 MG/DL — CRITICAL HIGH (ref 1.8–2.4)
MCHC RBC-ENTMCNC: 27.6 PG — SIGNIFICANT CHANGE UP (ref 27–31)
MCHC RBC-ENTMCNC: 28.4 PG — SIGNIFICANT CHANGE UP (ref 27–31)
MCHC RBC-ENTMCNC: 30.9 G/DL — LOW (ref 32–37)
MCHC RBC-ENTMCNC: 32.1 G/DL — SIGNIFICANT CHANGE UP (ref 32–37)
MCV RBC AUTO: 88.3 FL — SIGNIFICANT CHANGE UP (ref 81–99)
MCV RBC AUTO: 89.3 FL — SIGNIFICANT CHANGE UP (ref 81–99)
MONOCYTES # BLD AUTO: 0.52 K/UL — SIGNIFICANT CHANGE UP (ref 0.1–0.6)
MONOCYTES # BLD AUTO: 0.57 K/UL — SIGNIFICANT CHANGE UP (ref 0.1–0.6)
MONOCYTES NFR BLD AUTO: 6.3 % — SIGNIFICANT CHANGE UP (ref 1.7–9.3)
MONOCYTES NFR BLD AUTO: 8.1 % — SIGNIFICANT CHANGE UP (ref 1.7–9.3)
NEUTROPHILS # BLD AUTO: 4.73 K/UL — SIGNIFICANT CHANGE UP (ref 1.4–6.5)
NEUTROPHILS # BLD AUTO: 5.45 K/UL — SIGNIFICANT CHANGE UP (ref 1.4–6.5)
NEUTROPHILS NFR BLD AUTO: 66.5 % — SIGNIFICANT CHANGE UP (ref 42.2–75.2)
NEUTROPHILS NFR BLD AUTO: 67.5 % — SIGNIFICANT CHANGE UP (ref 42.2–75.2)
NRBC BLD AUTO-RTO: 0 /100 WBCS — SIGNIFICANT CHANGE UP (ref 0–0)
NRBC BLD AUTO-RTO: 0 /100 WBCS — SIGNIFICANT CHANGE UP (ref 0–0)
PHOSPHATE SERPL-MCNC: 3.7 MG/DL — SIGNIFICANT CHANGE UP (ref 2.1–4.9)
PHOSPHATE SERPL-MCNC: 3.8 MG/DL — SIGNIFICANT CHANGE UP (ref 2.1–4.9)
PLATELET # BLD AUTO: 233 K/UL — SIGNIFICANT CHANGE UP (ref 130–400)
PLATELET # BLD AUTO: 237 K/UL — SIGNIFICANT CHANGE UP (ref 130–400)
PMV BLD: 11.1 FL — HIGH (ref 7.4–10.4)
PMV BLD: 11.5 FL — HIGH (ref 7.4–10.4)
POTASSIUM SERPL-MCNC: 4.3 MMOL/L — SIGNIFICANT CHANGE UP (ref 3.5–5)
POTASSIUM SERPL-MCNC: 4.5 MMOL/L — SIGNIFICANT CHANGE UP (ref 3.5–5)
POTASSIUM SERPL-SCNC: 4.3 MMOL/L — SIGNIFICANT CHANGE UP (ref 3.5–5)
POTASSIUM SERPL-SCNC: 4.5 MMOL/L — SIGNIFICANT CHANGE UP (ref 3.5–5)
PROT SERPL-MCNC: 6.3 G/DL — SIGNIFICANT CHANGE UP (ref 6–8)
PROT SERPL-MCNC: 7.2 G/DL — SIGNIFICANT CHANGE UP (ref 6–8)
PROTHROM AB SERPL-ACNC: 9.9 SEC — LOW (ref 9.95–12.87)
RBC # BLD: 4.02 M/UL — LOW (ref 4.2–5.4)
RBC # BLD: 4.09 M/UL — LOW (ref 4.2–5.4)
RBC # FLD: 13.8 % — SIGNIFICANT CHANGE UP (ref 11.5–14.5)
RBC # FLD: 14 % — SIGNIFICANT CHANGE UP (ref 11.5–14.5)
SODIUM SERPL-SCNC: 139 MMOL/L — SIGNIFICANT CHANGE UP (ref 135–146)
SODIUM SERPL-SCNC: 141 MMOL/L — SIGNIFICANT CHANGE UP (ref 135–146)
WBC # BLD: 7.01 K/UL — SIGNIFICANT CHANGE UP (ref 4.8–10.8)
WBC # BLD: 8.19 K/UL — SIGNIFICANT CHANGE UP (ref 4.8–10.8)
WBC # FLD AUTO: 7.01 K/UL — SIGNIFICANT CHANGE UP (ref 4.8–10.8)
WBC # FLD AUTO: 8.19 K/UL — SIGNIFICANT CHANGE UP (ref 4.8–10.8)

## 2025-06-13 PROCEDURE — 99232 SBSQ HOSP IP/OBS MODERATE 35: CPT

## 2025-06-13 PROCEDURE — 70553 MRI BRAIN STEM W/O & W/DYE: CPT | Mod: 26

## 2025-06-13 PROCEDURE — 99223 1ST HOSP IP/OBS HIGH 75: CPT

## 2025-06-13 PROCEDURE — 71046 X-RAY EXAM CHEST 2 VIEWS: CPT | Mod: 26

## 2025-06-13 PROCEDURE — 93010 ELECTROCARDIOGRAM REPORT: CPT

## 2025-06-13 PROCEDURE — 76377 3D RENDER W/INTRP POSTPROCES: CPT | Mod: 26

## 2025-06-13 PROCEDURE — 95720 EEG PHY/QHP EA INCR W/VEEG: CPT

## 2025-06-13 RX ORDER — CALCITRIOL 0.5 UG/1
0 CAPSULE, GELATIN COATED ORAL
Qty: 0 | Refills: 4 | DISCHARGE

## 2025-06-13 RX ORDER — LEVOTHYROXINE SODIUM 300 MCG
0 TABLET ORAL
Qty: 0 | Refills: 0 | DISCHARGE

## 2025-06-13 RX ORDER — LEVETIRACETAM 10 MG/ML
1 INJECTION, SOLUTION INTRAVENOUS
Qty: 60 | Refills: 0
Start: 2025-06-13 | End: 2025-07-12

## 2025-06-13 RX ORDER — MAGNESIUM SULFATE 500 MG/ML
2 SYRINGE (ML) INJECTION ONCE
Refills: 0 | Status: COMPLETED | OUTPATIENT
Start: 2025-06-13 | End: 2025-06-13

## 2025-06-13 RX ORDER — CALCITRIOL 0.5 UG/1
1 CAPSULE, GELATIN COATED ORAL
Refills: 0 | DISCHARGE

## 2025-06-13 RX ORDER — LEVETIRACETAM 10 MG/ML
0 INJECTION, SOLUTION INTRAVENOUS
Qty: 0 | Refills: 3 | DISCHARGE

## 2025-06-13 RX ORDER — LEVOTHYROXINE SODIUM 300 MCG
1 TABLET ORAL
Refills: 0 | DISCHARGE

## 2025-06-13 RX ORDER — MAGNESIUM SULFATE 500 MG/ML
2 SYRINGE (ML) INJECTION
Refills: 0 | Status: COMPLETED | OUTPATIENT
Start: 2025-06-13 | End: 2025-06-13

## 2025-06-13 RX ORDER — FENOFIBRATE 160 MG/1
1 TABLET ORAL
Refills: 0 | DISCHARGE

## 2025-06-13 RX ORDER — FENOFIBRATE 160 MG/1
0 TABLET ORAL
Qty: 0 | Refills: 0 | DISCHARGE

## 2025-06-13 RX ADMIN — Medication 1000 MILLILITER(S): at 11:40

## 2025-06-13 RX ADMIN — Medication 25 GRAM(S): at 09:29

## 2025-06-13 RX ADMIN — INSULIN LISPRO 4: 100 INJECTION, SOLUTION INTRAVENOUS; SUBCUTANEOUS at 11:50

## 2025-06-13 RX ADMIN — INSULIN LISPRO 4: 100 INJECTION, SOLUTION INTRAVENOUS; SUBCUTANEOUS at 17:11

## 2025-06-13 RX ADMIN — Medication 25 GRAM(S): at 12:52

## 2025-06-13 RX ADMIN — LEVETIRACETAM 500 MILLIGRAM(S): 10 INJECTION, SOLUTION INTRAVENOUS at 17:42

## 2025-06-13 RX ADMIN — Medication 25 GRAM(S): at 17:42

## 2025-06-13 RX ADMIN — HEPARIN SODIUM 5000 UNIT(S): 1000 INJECTION INTRAVENOUS; SUBCUTANEOUS at 17:42

## 2025-06-13 RX ADMIN — LEVETIRACETAM 500 MILLIGRAM(S): 10 INJECTION, SOLUTION INTRAVENOUS at 05:39

## 2025-06-13 RX ADMIN — HEPARIN SODIUM 5000 UNIT(S): 1000 INJECTION INTRAVENOUS; SUBCUTANEOUS at 05:39

## 2025-06-13 RX ADMIN — FENOFIBRATE 48 MILLIGRAM(S): 160 TABLET ORAL at 11:50

## 2025-06-13 NOTE — DISCHARGE NOTE NURSING/CASE MANAGEMENT/SOCIAL WORK - NSDCPEFALRISK_GEN_ALL_CORE
For information on Fall & Injury Prevention, visit: [email or web address] OR  [email or web address] OR  https://www.cdc.gov/steadi/patient.html

## 2025-06-13 NOTE — DISCHARGE NOTE PROVIDER - PROVIDER TOKENS
PROVIDER:[TOKEN:[[id number]],FOLLOWUP:[2 weeks]],PROVIDER:[TOKEN:[[id number]],FOLLOWUP:[2 weeks]]

## 2025-06-13 NOTE — PROGRESS NOTE ADULT - ASSESSMENT
79-year-old female with a past medical history of hypertension, CABG, hyperlipidemia, diabetes, seizures, on Keppra presented to the emergency department after an episode of minimal responsiveness and AMS after receiving nitroglycerin during CCTA.  Stroke code called on arrival CTH within normal limits and CTA without LVO or significant stenoses. Had 2 witnessed generalized tonic clonic seizures in the ED. Besides the dose skipped today the patient reported good adherence to keppra. Suspect sudden onset confusion and aphasia secondary to hypoperfusion after receiving nitroglycerin vs TIA. Seizures may have been triggered by hypoperfusion in setting of missing her dose the day of admission.     #Seizure-like activity  - s/p keppra 1000 mg x 1 and ativan 2 mg x 1 in the ED  - UA neg for infection  - CXR negative for pneumonia   - Neurochecks and vitals q8h  - Seizure precautions, fall precautions  - Pending brain MRI noncon for seizure work up  - Start vEEG  - Ativan 2 mg IV PRN for seizure  - On home keppra 250 mg bid, start keppra 500 mg IV bid    #HTN  - HOLDING home lisinopril 5 mg daily    #DM  - On home glipizide 5 mg bid, lispro 6 u in morning, 15 u at lunch, and 20 u at dinner, lantus 30 mg at bedtime  - c/w FS and mISS, holding home insulin while NPO    #HLD  - On home simvastatin 20 mg nightly and fenofibrate 48 mg daily  - c/w atorvastatin 10 mg nightly and fenofibrate 48 mg daily    #Hypothyroidism  - c/w home levothyroxine 88 mcg daily    #MISC  - DVT PPx: Lovenox  - Diet: NPO until dysphagia screen and S&S  - CODE Status: Full  - Dispo: General Neurology   79-year-old female with a past medical history of hypertension, CABG, hyperlipidemia, diabetes, seizures, on Keppra presented to the emergency department after an episode of minimal responsiveness and AMS after receiving nitroglycerin during CCTA.  Stroke code called on arrival CTH within normal limits and CTA without LVO or significant stenoses. Had 2 witnessed generalized tonic clonic seizures in the ED. Besides the dose skipped today the patient reported good adherence to keppra. During admission the patient had multiple episodes of staring and unresponsiveness however resists eye opening and retracts all extremities to noxious stimuli during these episodes, and there are no epileptic correlate on EEG. Had a similar event after doing the CCTA on arrival, and again after doing the [facility] inpatient. MRH within normal limits. 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  - s/p keppra 1000 mg x 1 and ativan 2 mg x 1 in the ED  - UA neg for infection  - CXR negative for pneumonia   - Neurochecks and vitals q8h  - Seizure precautions, fall precautions  - Pending brain MRI noncon for seizure work up  - Start vEEG  - Ativan 2 mg IV PRN for seizure  - On home keppra 250 mg bid, start keppra 500 mg IV twice daily > continue on discharge     #HTN  - HOLDING home lisinopril 5 mg daily    #DM  - On home glipizide 5 mg bid, lispro 6 u in morning, 15 u at lunch, and 20 u at dinner, lantus 30 mg at bedtime  - c/w FS and mISS, holding home insulin while NPO    #HLD  - On home simvastatin 20 mg nightly and fenofibrate 48 mg daily  - c/w atorvastatin 10 mg nightly and fenofibrate 48 mg daily    #Hypothyroidism  - c/w home levothyroxine 88 mcg daily    #MISC  - DVT PPx: Lovenox  - Diet: DASH consistent carbohydrate   - CODE Status: Full  - Dispo: General Neurology

## 2025-06-13 NOTE — EEG REPORT - NS EEG TEXT BOX
Epilepsy Attending Note:     MOUSTAPHA ROSENBERG    79y Female  MRN [id number]    Vital Signs Last 24 Hrs  T(C): 36.4 (13 Jun 2025 05:15), Max: 37 (12 Jun 2025 13:22)  T(F): 97.6 (13 Jun 2025 05:15), Max: 98.6 (12 Jun 2025 13:22)  HR: 66 (13 Jun 2025 05:15) (60 - 72)  BP: 121/67 (13 Jun 2025 05:15) (121/67 - 154/67)  BP(mean): 91 (12 Jun 2025 14:00) (91 - 91)  RR: 16 (13 Jun 2025 05:15) (16 - 18)  SpO2: 99% (13 Jun 2025 05:15) (95% - 99%)    Parameters below as of 13 Jun 2025 05:15  Patient On (Oxygen Delivery Method): nasal cannula  O2 Flow (L/min): 2                            11.1   8.19  )-----------( 233      ( 13 Jun 2025 04:43 )             35.9       06-13    139  |  105  |  26[H]  ----------------------------<  106[H]  4.3   |  25  |  1.3    Ca    9.7      13 Jun 2025 04:43  Phos  3.8     06-13  Mg     1.5     06-13    TPro  6.3  /  Alb  4.0  /  TBili  0.4  /  DBili  x   /  AST  51[H]  /  ALT  40  /  AlkPhos  78  06-13      MEDICATIONS  (STANDING):  atorvastatin 10 milliGRAM(s) Oral at bedtime  calcitriol   Capsule 0.25 MICROGram(s) Oral <User Schedule>  dextrose 5%. 1000 milliLiter(s) (50 mL/Hr) IV Continuous <Continuous>  dextrose 5%. 1000 milliLiter(s) (100 mL/Hr) IV Continuous <Continuous>  dextrose 50% Injectable 25 Gram(s) IV Push once  dextrose 50% Injectable 12.5 Gram(s) IV Push once  dextrose 50% Injectable 25 Gram(s) IV Push once  fenofibrate Tablet 48 milliGRAM(s) Oral daily  glucagon  Injectable 1 milliGRAM(s) IntraMuscular once  heparin   Injectable 5000 Unit(s) SubCutaneous every 12 hours  insulin glargine Injectable (LANTUS) 30 Unit(s) SubCutaneous at bedtime  insulin lispro (ADMELOG) corrective regimen sliding scale   SubCutaneous three times a day before meals  insulin lispro (ADMELOG) corrective regimen sliding scale   SubCutaneous at bedtime  levETIRAcetam   Injectable 500 milliGRAM(s) IV Push every 12 hours  levothyroxine 88 MICROGram(s) Oral daily  sodium chloride 0.9%. 1000 milliLiter(s) (50 mL/Hr) IV Continuous <Continuous>    MEDICATIONS  (PRN):  dextrose Oral Gel 15 Gram(s) Oral once PRN Blood Glucose LESS THAN 70 milliGRAM(s)/deciliter  LORazepam   Injectable 2 milliGRAM(s) IV Push once PRN Seizure Activity            VEEG in the last 24 hours:    Background------------continues, symmetrical, well organized reaching 8-9 hz  showing reactivity and normal sleep patterns    Focal and generalized slowing--------------none    Interictal activity----------------  none    Events-----------  today am around 9.45 am reportedly patient had an event characterized by fluctuations in the level of her response / eyelid twitching which was not associated with epileptiform EEG changes     Seizures------- none    Impression: normal A/D/s EEG .  None epileptic event as above    Plan - as/ neurology

## 2025-06-13 NOTE — SWALLOW BEDSIDE ASSESSMENT ADULT - SLP PERTINENT HISTORY OF CURRENT PROBLEM
79-year-old female with a past medical history of hypertension, CABG, hyperlipidemia, diabetes, seizures, on Keppra presented to the emergency department after an episode of minimal responsiveness and AMS after receiving nitroglycerin during CCTA.  Stroke code called on arrival CTH within normal limits and CTA without LVO or significant stenoses. Had 2 witnessed generalized tonic clonic seizures in the ED. Besides the dose skipped today the patient reported good adherence to keppra. Suspect sudden onset confusion and aphasia secondary to hypoperfusion after receiving nitroglycerin vs TIA. Seizures may have been triggered by hypoperfusion in setting of missing her dose the day of admission.

## 2025-06-13 NOTE — DISCHARGE NOTE PROVIDER - HOSPITAL COURSE
Hospital course:  79-year-old female with a past medical history of hypertension, CABG, hyperlipidemia, diabetes, seizures, on Keppra presents to the emergency department after an episode of minimal responsiveness and AMS after receiving nitroglycerin during CCTA.  Further collateral obtained by granddaughter at bedside. Patient presented to the emergency department with expressive aphasia that subsequently improved to dysarthria. Stroke code on arrival NIHSS 0  [facility] without acute intracranial pathology. CTA without LVO not a candidate for IV or IA acute stroke intervention. Had 2 witnessed generalized tonic-clonic seizures requiring ativan. Granddaughter witnessed one of these that was characterized by tonic-clonic movement of all extremities with eyes open and looking upward lasting 1 second, no urinary incontinence, no tongue bite, followed by 40min of lethargy and staring. Missed dose of keppra 250mg twice daily today. Patient reported last seizure happened 3 years ago suddenly while sitting on the couch, unclear trigger. 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 Patient reported being adherent to keppra for ~5 years though the dose has been adjusted several times. Epilepsy now managed by primary care provider because her neurologist retired. Patient unsure why they were sent for CCTA by cardiology. Reported has a history of epilepsy since childhood, denied head injury, known intracranial masses or strokes, insomnia, smoking, recreational drug use, alcohol use. Reported cough for 3 weeks for which primary care provider prescribed a course of antibiotics that she completed.    During admission the patient had multiple episodes of staring and unresponsiveness however resists eye opening and retracts all extremities to noxious stimuli during these episodes, and there are no epileptic correlate on EEG. Had a similar event after doing the CCTA on arrival, and again after doing the [facility] inpatient (which triggered a rapid response, however the patient returned to baseline within minutes). MRH within normal limits.     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 Vessels within normal limits   CTP: within normal limits   MR Head Non Contrast: within normal limits     Physical exam at discharge:  GENERAL: well-developed, well-nourished, in no acute distress    NEUROLOGIC:  Mental status: AOx4  Language: Speech with intact fluency , naming , repetition , comprehension, absent dysarthria, patient is hypophonic (baseline)  Cranial nerves:   II: Visual fields are full to confrontation  III, IV, VI: Extraocular movements intact, no noticeable nystagmus, pupils equally round and reactive to light  V:  Facial sensation V1-V3 equal and intact   VII: L facial droop (per patient this is baseline)  VIII: Hearing is intact bilaterally intact  IX, X: Uvula is midline and soft palate rises symmetrically  XI: Head turning and shoulder shrug are intact and symmetric  XII: Tongue protrudes midline  Motor:  - shoulder abduction     R 5/5     L 5/5  - elbow flexion     R 5/5     L 5/5  - elbow extension     R 5/5     L 5/5  - hand      R 5/5     L 5/5   - hip flexion     R 5/5     L 5/5  - knee extension     R 5/5     L 5/5  - dorsiflexion     R 5/5     L 5/5  - plantarflexion     R 5/5     L 5/5  Sensation: Intact to light touch bilaterally. No neglect or extinction on double simultaneous testing.  Coordination: No dysmetria on finger-to-nose and heel-to-shin bilaterally, rapid alternating movements intact and symmetric  Reflexes:   - biceps     R 2+     L 2+  - triceps     R 2+     L 2+  - brachioradialis     R 2+     L 2+  - patellar     R 1+     L 1+  - Achilles     R 2+     L 2+  - Babinski     R downgoing    L downgoing  Gait: Deferred at this encounter    Medication changes on discharge: keppra 500mg twice daily   Labs to be followed up on after discharge: keppra level   Imaging to be done after discharge: repeat MRH per neurology , repeat LFTs  Further workup after discharge: follow up neurology  , primary care provider ,

## 2025-06-13 NOTE — RAPID RESPONSE TEAM SUMMARY - NSSITUATIONBACKGROUNDRRT_GEN_ALL_CORE
Patient completed MRI and observed to be unresponsive and staring off, by time writer arrived patient started responding again, answering name and location (not time) and name of her granddaughter. Patient also moving all extremities, retracting all extremities to pain, no gaze deviation. DWI on MRI just performed did not show any stroke. Vitals stable, patient o2 dropping 90s on room air, put back on 3L O2. Patient was complaining about some chest tightness however rhythm and EKG were similar to prior, and PPM was put working appropriately in safety mode.

## 2025-06-13 NOTE — DISCHARGE NOTE PROVIDER - CARE PROVIDER_API CALL
Mason Gutierrez  Neurology  [address] [place] [address], [address]  [place] [address]  Phone: [phone]  Fax: [phone]  Follow Up Time: 2 weeks    [facility]  Pulmonary Disease  [address] [place] [address]  Phone: ([phone]  Fax: ()-  Follow Up Time: 2 weeks

## 2025-06-13 NOTE — PROGRESS NOTE ADULT - SUBJECTIVE AND OBJECTIVE BOX
Neurology Progress Note    Interval History:     Medications:  atorvastatin 10 milliGRAM(s) Oral at bedtime  calcitriol   Capsule 0.25 MICROGram(s) Oral <User Schedule>  dextrose 5%. 1000 milliLiter(s) IV Continuous <Continuous>  dextrose 5%. 1000 milliLiter(s) IV Continuous <Continuous>  dextrose 50% Injectable 25 Gram(s) IV Push once  dextrose 50% Injectable 12.5 Gram(s) IV Push once  dextrose 50% Injectable 25 Gram(s) IV Push once  dextrose Oral Gel 15 Gram(s) Oral once PRN  fenofibrate Tablet 48 milliGRAM(s) Oral daily  glucagon  Injectable 1 milliGRAM(s) IntraMuscular once  heparin   Injectable 5000 Unit(s) SubCutaneous every 12 hours  insulin glargine Injectable (LANTUS) 30 Unit(s) SubCutaneous at bedtime  insulin lispro (ADMELOG) corrective regimen sliding scale   SubCutaneous three times a day before meals  insulin lispro (ADMELOG) corrective regimen sliding scale   SubCutaneous at bedtime  levETIRAcetam   Injectable 500 milliGRAM(s) IV Push every 12 hours  levothyroxine 88 MICROGram(s) Oral daily  LORazepam   Injectable 2 milliGRAM(s) IV Push once PRN  magnesium sulfate  IVPB 2 Gram(s) IV Intermittent once    Vital Signs Last 24 Hrs  T(C): 36.4 (13 Jun 2025 05:15), Max: 37 (12 Jun 2025 13:22)  T(F): 97.6 (13 Jun 2025 05:15), Max: 98.6 (12 Jun 2025 13:22)  HR: 66 (13 Jun 2025 05:15) (60 - 72)  BP: 121/67 (13 Jun 2025 05:15) (121/67 - 154/67)  BP(mean): 91 (12 Jun 2025 14:00) (91 - 91)  RR: 16 (13 Jun 2025 05:15) (16 - 18)  SpO2: 99% (13 Jun 2025 05:15) (95% - 99%)    Parameters below as of 13 Jun 2025 05:15  Patient On (Oxygen Delivery Method): nasal cannula  O2 Flow (L/min): 2    Physical Exam  GENERAL: well-developed, well-nourished, in no acute distress    NEUROLOGIC:  Mental status: AOx  Language: Speech with intact fluency , naming , repetition , comprehension, absent dysarthria  Cranial nerves:   II: Visual fields are full to confrontation  III, IV, VI: Extraocular movements intact, no noticeable nystagmus, pupils equally round and reactive to light  V:  Facial sensation V1-V3 equal and intact   VII: Face is symmetric with normal eye closure and smile  VIII: Hearing is intact bilaterally intact  IX, X: Uvula is midline and soft palate rises symmetrically  XI: Head turning and shoulder shrug are intact and symmetric  XII: Tongue protrudes midline  Motor:  - shoulder abduction     R 5/5     L 5/5  - elbow flexion     R 5/5     L 5/5  - elbow extension     R 5/5     L 5/5  - hand      R 5/5     L 5/5   - wrist flexion     R 5/5     L 5/5  - wrist extension     R 5/5     L 5/5  - finger flexion     R 5/5     L 5/5  - finger extension     R 5/5     L 5/5  - finger abduction     R 5/5     L 5/5  - hip flexion     R 5/5     L 5/5  - knee flexion     R 5/5     L 5/5  - knee extension     R 5/5     L 5/5  - dorsiflexion     R 5/5     L 5/5  - plantarflexion     R 5/5     L 5/5  - pronator drift     absent / present L / R  Sensation: Intact to light touch bilaterally. No neglect or extinction on double simultaneous testing.  Coordination: No dysmetria on finger-to-nose and heel-to-shin bilaterally, rapid alternating movements intact and symmetric  Reflexes:   - biceps     R 2+     L 2+  - triceps     R 2+     L 2+  - brachioradialis     R 2+     L 2+  - patellar     R 2+     L 2+  - Achilles     R 2+     L 2+  - Babinski     R downgoing / upgoing    L downgoing / upgoing  - [place]     R absent / present     L absent / present  Gait: Narrow gait and steady, Romberg sign negative / Deferred at this encounter    NIHSS:   - Level of consciousness:   - Ask month and age:   - 'Blink eyes' & 'squeeze hands':   - Horizontal extraocular movements:   - Visual fields:   - Facial palsy:   - Left arm motor drift:   - Right arm motor drift:   - Left leg motor drift:   - Right leg motor drift:   - Limb Ataxia:   - Sensation:   - Language/aphasia:   - Dysarthria:   - Extinction/inattention:     Labs:  CBC Full  -  ( 13 Jun 2025 04:43 )  WBC Count : 8.19 K/uL  RBC Count : 4.02 M/uL  Hemoglobin : 11.1 g/dL  Hematocrit : 35.9 %  Platelet Count - Automated : 233 K/uL  Mean Cell Volume : 89.3 fL  Mean Cell Hemoglobin : 27.6 pg  Mean Cell Hemoglobin Concentration : 30.9 g/dL  Auto Neutrophil # : x  Auto Lymphocyte # : x  Auto Monocyte # : x  Auto Eosinophil # : x  Auto Basophil # : x  Auto Neutrophil % : x  Auto Lymphocyte % : x  Auto Monocyte % : x  Auto Eosinophil % : x  Auto Basophil % : x    06-13    139  |  105  |  26[H]  ----------------------------<  106[H]  4.3   |  25  |  1.3    Ca    9.7      13 Jun 2025 04:43  Phos  3.8     06-13  Mg     1.5     06-13    TPro  6.3  /  Alb  4.0  /  TBili  0.4  /  DBili  x   /  AST  51[H]  /  ALT  40  /  AlkPhos  78  06-13    LIVER FUNCTIONS - ( 13 Jun 2025 04:43 )  Alb: 4.0 g/dL / Pro: 6.3 g/dL / ALK PHOS: 78 U/L / ALT: 40 U/L / AST: 51 U/L / GGT: x           PT/INR - ( 12 Jun 2025 12:15 )   PT: 10.70 sec;   INR: 0.91 ratio         PTT - ( 12 Jun 2025 12:15 )  PTT:25.8 sec  Urinalysis Basic - ( 13 Jun 2025 04:43 )    Color: x / Appearance: x / SG: x / pH: x  Gluc: 106 mg/dL / Ketone: x  / Bili: x / Urobili: x   Blood: x / Protein: x / Nitrite: x   Leuk Esterase: x / RBC: x / WBC x   Sq Epi: x / Non Sq Epi: x / Bacteria: x     Neurology Progress Note    Interval History: patient reported doing well overnight however did not get to eat dinner so was hungry, during the day had episodes of staring and unresponsiveness however without EEG correlate    Medications:  atorvastatin 10 milliGRAM(s) Oral at bedtime  calcitriol   Capsule 0.25 MICROGram(s) Oral <User Schedule>  dextrose 5%. 1000 milliLiter(s) IV Continuous <Continuous>  dextrose 5%. 1000 milliLiter(s) IV Continuous <Continuous>  dextrose 50% Injectable 25 Gram(s) IV Push once  dextrose 50% Injectable 12.5 Gram(s) IV Push once  dextrose 50% Injectable 25 Gram(s) IV Push once  dextrose Oral Gel 15 Gram(s) Oral once PRN  fenofibrate Tablet 48 milliGRAM(s) Oral daily  glucagon  Injectable 1 milliGRAM(s) IntraMuscular once  heparin   Injectable 5000 Unit(s) SubCutaneous every 12 hours  insulin glargine Injectable (LANTUS) 30 Unit(s) SubCutaneous at bedtime  insulin lispro (ADMELOG) corrective regimen sliding scale   SubCutaneous three times a day before meals  insulin lispro (ADMELOG) corrective regimen sliding scale   SubCutaneous at bedtime  levETIRAcetam   Injectable 500 milliGRAM(s) IV Push every 12 hours  levothyroxine 88 MICROGram(s) Oral daily  LORazepam   Injectable 2 milliGRAM(s) IV Push once PRN  magnesium sulfate  IVPB 2 Gram(s) IV Intermittent once    Vital Signs Last 24 Hrs  T(C): 36.4 (13 Jun 2025 05:15), Max: 37 (12 Jun 2025 13:22)  T(F): 97.6 (13 Jun 2025 05:15), Max: 98.6 (12 Jun 2025 13:22)  HR: 66 (13 Jun 2025 05:15) (60 - 72)  BP: 121/67 (13 Jun 2025 05:15) (121/67 - 154/67)  BP(mean): 91 (12 Jun 2025 14:00) (91 - 91)  RR: 16 (13 Jun 2025 05:15) (16 - 18)  SpO2: 99% (13 Jun 2025 05:15) (95% - 99%)    Parameters below as of 13 Jun 2025 05:15  Patient On (Oxygen Delivery Method): nasal cannula  O2 Flow (L/min): 2    Physical Exam  GENERAL: well-developed, well-nourished, in no acute distress    NEUROLOGIC:  Mental status: AOx4  Language: Speech with intact fluency , naming , repetition , comprehension, absent dysarthria, patient is hypophonic (baseline)  Cranial nerves:   II: Visual fields are full to confrontation  III, IV, VI: Extraocular movements intact, no noticeable nystagmus, pupils equally round and reactive to light  V:  Facial sensation V1-V3 equal and intact   VII: L facial droop (per patient this is baseline)  VIII: Hearing is intact bilaterally intact  IX, X: Uvula is midline and soft palate rises symmetrically  XI: Head turning and shoulder shrug are intact and symmetric  XII: Tongue protrudes midline  Motor:  - shoulder abduction     R 5/5     L 5/5  - elbow flexion     R 5/5     L 5/5  - elbow extension     R 5/5     L 5/5  - hand      R 5/5     L 5/5   - hip flexion     R 5/5     L 5/5  - knee extension     R 5/5     L 5/5  - dorsiflexion     R 5/5     L 5/5  - plantarflexion     R 5/5     L 5/5  Sensation: Intact to light touch bilaterally. No neglect or extinction on double simultaneous testing.  Coordination: No dysmetria on finger-to-nose and heel-to-shin bilaterally, rapid alternating movements intact and symmetric  Reflexes:   - biceps     R 2+     L 2+  - triceps     R 2+     L 2+  - brachioradialis     R 2+     L 2+  - patellar     R 1+     L 1+  - Achilles     R 2+     L 2+  - Babinski     R downgoing    L downgoing  Gait: Deferred at this encounter    Labs:  CBC Full  -  ( 13 Jun 2025 04:43 )  WBC Count : 8.19 K/uL  RBC Count : 4.02 M/uL  Hemoglobin : 11.1 g/dL  Hematocrit : 35.9 %  Platelet Count - Automated : 233 K/uL  Mean Cell Volume : 89.3 fL  Mean Cell Hemoglobin : 27.6 pg  Mean Cell Hemoglobin Concentration : 30.9 g/dL  Auto Neutrophil # : x  Auto Lymphocyte # : x  Auto Monocyte # : x  Auto Eosinophil # : x  Auto Basophil # : x  Auto Neutrophil % : x  Auto Lymphocyte % : x  Auto Monocyte % : x  Auto Eosinophil % : x  Auto Basophil % : x    06-13    139  |  105  |  26[H]  ----------------------------<  106[H]  4.3   |  25  |  1.3    Ca    9.7      13 Jun 2025 04:43  Phos  3.8     06-13  Mg     1.5     06-13    TPro  6.3  /  Alb  4.0  /  TBili  0.4  /  DBili  x   /  AST  51[H]  /  ALT  40  /  AlkPhos  78  06-13    LIVER FUNCTIONS - ( 13 Jun 2025 04:43 )  Alb: 4.0 g/dL / Pro: 6.3 g/dL / ALK PHOS: 78 U/L / ALT: 40 U/L / AST: 51 U/L / GGT: x           PT/INR - ( 12 Jun 2025 12:15 )   PT: 10.70 sec;   INR: 0.91 ratio         PTT - ( 12 Jun 2025 12:15 )  PTT:25.8 sec  Urinalysis Basic - ( 13 Jun 2025 04:43 )    Color: x / Appearance: x / SG: x / pH: x  Gluc: 106 mg/dL / Ketone: x  / Bili: x / Urobili: x   Blood: x / Protein: x / Nitrite: x   Leuk Esterase: x / RBC: x / WBC x   Sq Epi: x / Non Sq Epi: x / Bacteria: x

## 2025-06-13 NOTE — CONSULT NOTE ADULT - ASSESSMENT
Assessment and Plan: 80 yo woman with PMH type 2 DM, HTN, HLD, CAD s/p CABG, seizures disorder admitted for episode of AMS/minimal responsiveness during CCTA testing and 2 episodes of seizures. Patient CT head and CTA head and neck no acute pathology, labs personally reviewed significant for K 5.4, Mag 1.5, cr 1.5. Patient admitted to neurology service for further workup. Medicine service consulted for co management.       #AMS likely 2/2 metabolic encephalopathy 2/2 AED vs syncope'  -r/o TIA/stroke  -Continue to monitor on tele to r/o arrythmia, frequent neuro checks as per unit protocol  -Fall and aspiration precautions  -PT/OT evaluation   -MRI brain    #Hypomagnesemia   -Replete and monitor     #Mild hyperkalemia   -Trended down continue to monitor     #MAO vs CKD3  -Cr trending down, continue to monitor, avoid nephrotoxic drugs and renally dose all medications   #Seizures   -Continue AED and ativan as needed  -vEEG     #Type 2 DM  -Hold hypoglycemic drugs  -ILSS, FS monitoring and hypoglycemia protocol     #HTN  -Resume home medications once appropriate     #Mild hyp  #HLD  - Continue home medications simvastatin 20 mg nightly and fenofibrate 48 mg daily    #Hypothyroidism  - Continue home levothyroxine 88 mcg daily    -Resume diet as per SLP, [id number]    Dispo and rest of the plan as per primary team neurology. Plan of care discussed with grand daughter by the bedside and neurology resident Dr. Cooper over phone. Please call hospitalist service if you have any question.   
Impression:  79-year-old female with a past medical history of hypertension, CABG, hyperlipidemia, diabetes, seizures, on Keppra presents to the emergency department after an episode of minimal responsiveness and AMS after receiving nitroglycerin during CCTA.  Patient presents to the emergency department with expressive aphasia that subsequently improved to dysarthria.  Patient still somewhat dysarthric but is able to speak in full sentences.  Patient had a similar episode 3 years ago when she was initiated on Keppra.  Unclear if at the time it was a seizure as patient is unsure. Stroke code en route. CTH without acute intracranial pathology. CTA without LVO. Patient with NIHSS 0 not a candidate for IV or IA acute stroke intervention. Etiology of symptoms more likely due to toxic metabolic cause which may be due to medication administration, less likely seizure.     Suggestion:  Please ensure she gets her seizure medication.  Avoid sedating medication.  No further neurologic workup   Call with questions [phone]

## 2025-06-13 NOTE — CONSULT NOTE ADULT - SUBJECTIVE AND OBJECTIVE BOX
Neurology Consult    Patient is a 79y old  Female who presents with a chief complaint of AMS    HPI:  79-year-old female with a past medical history of hypertension, CABG, hyperlipidemia, diabetes, seizures, on Keppra presents to the emergency department after an episode of minimal responsiveness and AMS after receiving nitroglycerin during CCTA.  Patient presents to the emergency department with expressive aphasia that subsequently improved to dysarthria.  Patient still somewhat dysarthric but is able to speak in full sentences.  Patient had a similar episode 3 years ago when she was initiated on Keppra.  Unclear if at the time it was a seizure as patient is unsure. Stroke code en route.  PAST MEDICAL & SURGICAL HISTORY:      FAMILY HISTORY:      Social History: (-) x 3    Allergies    No Known Allergies    Intolerances        MEDICATIONS  (STANDING):    MEDICATIONS  (PRN):      Vital Signs Last 24 Hrs  T(C): 37 (12 Jun 2025 13:22), Max: 37 (12 Jun 2025 13:22)  T(F): 98.6 (12 Jun 2025 13:22), Max: 98.6 (12 Jun 2025 13:22)  HR: 60 (12 Jun 2025 13:22) (60 - 72)  BP: 135/68 (12 Jun 2025 13:22) (135/68 - 154/67)  BP(mean): --  RR: 18 (12 Jun 2025 13:22) (16 - 18)  SpO2: 99% (12 Jun 2025 13:22) (95% - 99%)    Parameters below as of 12 Jun 2025 13:22  Patient On (Oxygen Delivery Method): room air        Examination:  Cognitive/Language:  The patient is oriented to person, place, time and date.  Recent and remote memory intact.  Fund of knowledge is intact and normal.  Language with normal repetition, comprehension and naming.  Nondysarthric.    Eyes: intact VA, VFF.  EOMI w/o nystagmus, skew or reported double vision.  PERRL.  No ptosis/weakness of eyelid closure.    Face:  Facial sensation normal V1 - 3, no facial asymmetry.    Mildly increased tone of muscles throughout  Formal Muscle Strength Testing: (MRC grade R/L) 5/5 UE; 5/5 LE.  No observable drift.  Sensory examination:   Intact to light touch in all extremities.  Cerebellum:   FTN/HKS intact with normal REYNALDO in all limbs.  No dysmetria or dysdiadokinesia.      NIHSS 0    Labs:   CBC Full  -  ( 12 Jun 2025 12:15 )  WBC Count : 6.65 K/uL  RBC Count : 4.02 M/uL  Hemoglobin : 11.1 g/dL  Hematocrit : 35.7 %  Platelet Count - Automated : 234 K/uL  Mean Cell Volume : 88.8 fL  Mean Cell Hemoglobin : 27.6 pg  Mean Cell Hemoglobin Concentration : 31.1 g/dL  Auto Neutrophil # : x  Auto Lymphocyte # : x  Auto Monocyte # : x  Auto Eosinophil # : x  Auto Basophil # : x  Auto Neutrophil % : x  Auto Lymphocyte % : x  Auto Monocyte % : x  Auto Eosinophil % : x  Auto Basophil % : x            PT/INR - ( 12 Jun 2025 12:15 )   PT: 10.70 sec;   INR: 0.91 ratio                 Neuroimaging:  < from: CT Angio Neck Stroke Protocol w/ IV Cont (06.12.25 @ 12:55) >  IMPRESSION:    CT PERFUSION:  No perfusion deficits to suggest areas of completed infarction or at risk   territory.    CTA HEAD/NECK:  No large vessel occlusion, stenosis, aneurysm, or vascular malformation.    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      < end of copied text >        
HPI/Subjective: 78 yo woman with PMH type 2 DM, HTN, HLD, CAD s/p CABG, seizures disorder admitted for episode of AMS/minimal responsiveness during CCTA testing and 2 episodes of seizures. Patient admitted to neurology service for further workup. Medicine service consulted for co management. Patient seen and examine by the bedside this morning. Patient sleepy/confused unable to provide any history, history obtained from patient grand daughter Denise by the bedside. As per daughter patient came to cardiology office for testing and while in office she became altered after medications and later on while in ED she start to have tonic clonic seizures. There is no reported weakness or numbness any part of the body, fever, chills, cough, nausea, vomiting, diarrhea, fever, chills.     ROS: negative except as above     MEDICATIONS  STANDING MEDICATIONS  atorvastatin 10 milliGRAM(s) Oral at bedtime  calcitriol   Capsule 0.25 MICROGram(s) Oral <User Schedule>  dextrose 5%. 1000 milliLiter(s) IV Continuous <Continuous>  dextrose 5%. 1000 milliLiter(s) IV Continuous <Continuous>  dextrose 50% Injectable 25 Gram(s) IV Push once  dextrose 50% Injectable 12.5 Gram(s) IV Push once  dextrose 50% Injectable 25 Gram(s) IV Push once  fenofibrate Tablet 48 milliGRAM(s) Oral daily  glucagon  Injectable 1 milliGRAM(s) IntraMuscular once  heparin   Injectable 5000 Unit(s) SubCutaneous every 12 hours  insulin glargine Injectable (LANTUS) 30 Unit(s) SubCutaneous at bedtime  insulin lispro (ADMELOG) corrective regimen sliding scale   SubCutaneous three times a day before meals  insulin lispro (ADMELOG) corrective regimen sliding scale   SubCutaneous at bedtime  levETIRAcetam   Injectable 500 milliGRAM(s) IV Push every 12 hours  levothyroxine 88 MICROGram(s) Oral daily  magnesium sulfate  IVPB 2 Gram(s) IV Intermittent every 2 hours  sodium chloride 0.9%. 1000 milliLiter(s) IV Continuous <Continuous>    PRN MEDICATIONS  dextrose Oral Gel 15 Gram(s) Oral once PRN  LORazepam   Injectable 2 milliGRAM(s) IV Push once PRN    VITALS:  T(F): 97.1  HR: 82  BP: 118/64  RR: 17  SpO2: 98%    PHYSICAL EXAM  GEN: Lying comfortably, NAD  HEENT: EEG leads in place, head NC/AT, EOMI, trachea midline, no lymphadenopathy  PULM: BS heard b/l equal, No wheezing  CVS: S1/S2 present, no rubs or gallops  ABD: Soft, non-distended, no guarding; non-tender  EXT: No lower extremity edema or varicose   : No suprapubic tenderness  Skin: no rashes  NEURO: Sleepy, on waking up dose not fully follow commands.     LABS                        11.1   8.19  )-----------( 233      ( 13 Jun 2025 04:43 )             35.9     06-13    139  |  105  |  26[H]  ----------------------------<  106[H]  4.3   |  25  |  1.3    Ca    9.7      13 Jun 2025 04:43  Phos  3.8     06-13  Mg     1.5     06-13    TPro  6.3  /  Alb  4.0  /  TBili  0.4  /  DBili  x   /  AST  51[H]  /  ALT  40  /  AlkPhos  78  06-13    PT/INR - ( 12 Jun 2025 12:15 )   PT: 10.70 sec;   INR: 0.91 ratio         PTT - ( 12 Jun 2025 12:15 )  PTT:25.8 sec  Urinalysis Basic - ( 13 Jun 2025 04:43 )    Color: x / Appearance: x / SG: x / pH: x  Gluc: 106 mg/dL / Ketone: x  / Bili: x / Urobili: x   Blood: x / Protein: x / Nitrite: x   Leuk Esterase: x / RBC: x / WBC x   Sq Epi: x / Non Sq Epi: x / Bacteria: x            Urinalysis with Rflx Culture (collected 12 Jun 2025 13:45)          RADIOLOGY

## 2025-06-13 NOTE — DISCHARGE NOTE NURSING/CASE MANAGEMENT/SOCIAL WORK - FINANCIAL ASSISTANCE
[facility] provides services at a reduced cost to those who are determined to be eligible through [facility]’s financial assistance program. Information regarding [facility]’s financial assistance program can be found by going to [email or web address] or by calling [phone].

## 2025-06-13 NOTE — SWALLOW BEDSIDE ASSESSMENT ADULT - COMMENTS
#Seizure-like activity  - s/p keppra 1000 mg x 1 and ativan 2 mg x 1 in the ED  - UA neg for infection  - CXR negative for pneumonia   - Neurochecks and vitals q8h  - Seizure precautions, fall precautions  - Pending brain MRI noncon for seizure work up  - Start vEEG    as per staff, no active seizure activity during SLP's eval.

## 2025-06-13 NOTE — DISCHARGE NOTE PROVIDER - NSDCQMSTROKE_NEU_ALL_CORE
Ms. Crespo is here for management of anticoagulation for hx of DVT x 2 separate occasions.    PMH also significant for HTN.   She presents today w/out complaint.  Pt verifies dosing regimen as listed above.  Pt denies s/s bleeding/swelling/SOB.  No missed doses.   No changes in Rx/OTCs/Herbal medications.   Occasional EtOH use and denies tobacco.    Received a message from Dr. Arias's office to bridge pt with Lovenox for her upcoming procedure on 4/2, holding Warfarin 5 days. LVM to see if they called in Rx for Lovenox. Pt stated weight is 127 lbs. She uses [facility] on [address] in [place]    3/22: Called in lovenox shots to [facility] Antonieta Martínez, PharmD 3/22/2024 3:28 PM      INR 2.7 is within therapeutic range of 2-3.  Recommend to continue 7.5 mg every day   Pt was given bridging calendar for Lovenox, went over dosing with pt.   Pt has the 5 mg tablets.  Will continue to monitor and check INR after Sx.   Dosing reminder card given with phone number, appointment date and time.   Return to clinic: 4/8 @ 1100  Referring physician: Dr. Liz  Referral date: 3/20/23    For Pharmacy Admin Tracking Only    Intervention Detail:   Total # of Interventions Recommended: 1  Total # of Interventions Accepted: 1  Time Spent (min): 15        No

## 2025-06-13 NOTE — DISCHARGE NOTE NURSING/CASE MANAGEMENT/SOCIAL WORK - PATIENT PORTAL LINK FT
You can access the FollowMyHealth Patient Portal offered by [facility] by registering at the following website: [email or web address]. By joining [facility]’s FollowMyHealth portal, you will also be able to view your health information using other applications (apps) compatible with our system.

## 2025-06-13 NOTE — DISCHARGE NOTE PROVIDER - CARE PROVIDERS DIRECT ADDRESSES
,[email or web address]

## 2025-06-13 NOTE — PATIENT PROFILE ADULT - FALL HARM RISK - HARM RISK INTERVENTIONS

## 2025-06-13 NOTE — SWALLOW BEDSIDE ASSESSMENT ADULT - SLP GENERAL OBSERVATIONS
unresponsive; granddaughter at bedside who endorses that patient was awake and alert tolerating breakfast and talking this morning.

## 2025-06-13 NOTE — DISCHARGE NOTE PROVIDER - NSDCCPCAREPLAN_GEN_ALL_CORE_FT
PRINCIPAL DISCHARGE DIAGNOSIS  Diagnosis: Seizures  Assessment and Plan of Treatment: You were admitted for evaluation and management of: seizure like episodes  You were evaluated with: EEG, MRI  You were treated with: antiseizure medication  On discharge please do the following:   - take all medications as prescribed  - follow up with a primary care provider  - follow up with general neurology/epileptology  Please return to the ED if you feel unwell, or if you experience worsening symptoms, or any of the following symptoms but not limited to: chest pain, palpitations, shortness of breath, falls, confusion, seizures, sudden balance problems, double vision, vision problems, facial droop, arm or leg weakness, slurring, bleeding, severe pain

## 2025-06-13 NOTE — DISCHARGE NOTE PROVIDER - NSDCMRMEDTOKEN_GEN_ALL_CORE_FT
calcitriol 0.25 mcg oral capsule: 1 cap(s) orally Monday, Wednesday, and Friday  fenofibrate 48 mg oral tablet: 1 tab(s) orally once a day (in the morning)  glipiZIDE 5 mg oral tablet: 1 tab(s) orally twice a day with breakfast and dinner  Keppra 500 mg oral tablet: 1 tab(s) orally 2 times a day  levothyroxine 88 mcg (0.088 mg) oral tablet: 1 tab(s) orally once a day after breakfast  LISINOPRIL 5 MG TABLET: TAKE 1 TABLET BY MOUTH EVERY DAY  LORATADINE 10 MG TABLET: TAKE 1 TABLET BY MOUTH EVERY DAY IN THE MORNING  NOVOLOG 100 UNIT/ML FLEXPEN: INJECT 6 UNITS SUBCUTANEOUSLY IN THE MORNING AND 15 UNIT AT LUNCH AND 20 UNITS AT DINNER  SIMVASTATIN 20 MG TABLET: TAKE 1 TABLET BY MOUTH EVERY DAY  TRESIBA FLEXTOUCH 100 UNIT/ML: INJECT 30 UNITS SUBCUTANEOUSLY AT BEDTIME

## 2025-06-16 LAB — LEVETIRACETAM SERPL-MCNC: 11.7 UG/ML — SIGNIFICANT CHANGE UP (ref 10–40)

## 2025-06-17 PROBLEM — I10 ESSENTIAL (PRIMARY) HYPERTENSION: Chronic | Status: ACTIVE | Noted: 2025-06-12

## 2025-06-17 PROBLEM — E11.9 TYPE 2 DIABETES MELLITUS WITHOUT COMPLICATIONS: Chronic | Status: ACTIVE | Noted: 2025-06-12

## 2025-06-17 PROBLEM — E03.9 HYPOTHYROIDISM, UNSPECIFIED: Chronic | Status: ACTIVE | Noted: 2025-06-12

## 2025-06-17 PROBLEM — E78.5 HYPERLIPIDEMIA, UNSPECIFIED: Chronic | Status: ACTIVE | Noted: 2025-06-12

## 2025-06-19 DIAGNOSIS — Z96.1 PRESENCE OF INTRAOCULAR LENS: ICD-10-CM

## 2025-06-19 DIAGNOSIS — E11.22 TYPE 2 DIABETES MELLITUS WITH DIABETIC CHRONIC KIDNEY DISEASE: ICD-10-CM

## 2025-06-19 DIAGNOSIS — Z79.84 LONG TERM (CURRENT) USE OF ORAL HYPOGLYCEMIC DRUGS: ICD-10-CM

## 2025-06-19 DIAGNOSIS — Z95.0 PRESENCE OF CARDIAC PACEMAKER: ICD-10-CM

## 2025-06-19 DIAGNOSIS — Z79.890 HORMONE REPLACEMENT THERAPY: ICD-10-CM

## 2025-06-19 DIAGNOSIS — E78.5 HYPERLIPIDEMIA, UNSPECIFIED: ICD-10-CM

## 2025-06-19 DIAGNOSIS — E87.5 HYPERKALEMIA: ICD-10-CM

## 2025-06-19 DIAGNOSIS — N17.9 ACUTE KIDNEY FAILURE, UNSPECIFIED: ICD-10-CM

## 2025-06-19 DIAGNOSIS — N18.30 CHRONIC KIDNEY DISEASE, STAGE 3 UNSPECIFIED: ICD-10-CM

## 2025-06-19 DIAGNOSIS — I12.9 HYPERTENSIVE CHRONIC KIDNEY DISEASE WITH STAGE 1 THROUGH STAGE 4 CHRONIC KIDNEY DISEASE, OR UNSPECIFIED CHRONIC KIDNEY DISEASE: ICD-10-CM

## 2025-06-19 DIAGNOSIS — Z95.1 PRESENCE OF AORTOCORONARY BYPASS GRAFT: ICD-10-CM

## 2025-06-19 DIAGNOSIS — E03.9 HYPOTHYROIDISM, UNSPECIFIED: ICD-10-CM

## 2025-06-19 DIAGNOSIS — Z79.4 LONG TERM (CURRENT) USE OF INSULIN: ICD-10-CM

## 2025-06-19 DIAGNOSIS — E83.42 HYPOMAGNESEMIA: ICD-10-CM

## 2025-07-08 ENCOUNTER — NON-APPOINTMENT (OUTPATIENT)
Age: 80
End: 2025-07-08

## 2025-07-09 ENCOUNTER — APPOINTMENT (OUTPATIENT)
Dept: NEUROLOGY | Facility: CLINIC | Age: 80
End: 2025-07-09